# Patient Record
(demographics unavailable — no encounter records)

---

## 2024-10-17 NOTE — PLAN
[FreeTextEntry1] : In regards to patients Physical exam, routine blood work drawn, will review results with patient.   The patient has a moderate to high pre-test probability of Obstructive Sleep Apnea. This assessment is based on a high STOP-BANG score, medical history and clinical examination. As a result, diagnostic polysomnography is recommended to determine the presence and severity of this disorder.   Counseling included abnormal lab results, differential diagnoses, treatment options, risks and benefits, lifestyle changes, current condition, medications, and dose adjustments.  The patient was interactive, attentive, asked questions, and verbalized understanding

## 2024-10-17 NOTE — HISTORY OF PRESENT ILLNESS
[FreeTextEntry1] : physical exam  [de-identified] : Mr. KELSEY POOLE is a 36 year male comes to the office for physical exam. Patient denies fever, cough SOB. No other complaints at this time.

## 2024-10-17 NOTE — HEALTH RISK ASSESSMENT
[Good] : ~his/her~  mood as  good [Yes] : Yes [Monthly or less (1 pt)] : Monthly or less (1 point) [1 or 2 (0 pts)] : 1 or 2 (0 points) [Never (0 pts)] : Never (0 points) [No] : In the past 12 months have you used drugs other than those required for medical reasons? No [0] : 2) Feeling down, depressed, or hopeless: Not at all (0) [PHQ-2 Positive] : PHQ-2 Positive [Never] : Never [NO] : No [Patient declined Low Dose CT Scan] : Patient declined Low Dose CT Scan [Patient declined Retinal Exam] : Patient declined Retinal Exam. [HIV test declined] : HIV test declined [Hepatitis C test declined] : Hepatitis C test declined [Audit-CScore] : 1 [[id number]] : 0

## 2025-04-21 NOTE — HEALTH RISK ASSESSMENT
[Good] : ~his/her~  mood as  good [Yes] : Yes [Monthly or less (1 pt)] : Monthly or less (1 point) [1 or 2 (0 pts)] : 1 or 2 (0 points) [Never (0 pts)] : Never (0 points) [No] : In the past 12 months have you used drugs other than those required for medical reasons? No [0] : 2) Feeling down, depressed, or hopeless: Not at all (0) [PHQ-2 Positive] : PHQ-2 Positive [Audit-CScore] : 1 [[id number]] : 0 [Never] : Never [NO] : No [Patient declined Low Dose CT Scan] : Patient declined Low Dose CT Scan [Patient declined Retinal Exam] : Patient declined Retinal Exam. [HIV test declined] : HIV test declined [Hepatitis C test declined] : Hepatitis C test declined

## 2025-04-21 NOTE — HISTORY OF PRESENT ILLNESS
[FreeTextEntry1] : follow up  [de-identified] : Mr. KELSEY POOLE is a 36 year male comes to the office for physical exam. Patient denies fever, cough SOB. No other complaints at this time.

## 2025-04-21 NOTE — PLAN
[FreeTextEntry1] : HLD- lifestyle modifications recommended will retest lipids in 6 months.   Prior to appointment and during encounter with patient extensive medical records were reviewed including but not limited to, Hospital records, out patient records, laboratory data and microbiology data    Total encounter total time 30 mins >50% of time spent counseling/coordinating care  Counseling included abnormal lab results, differential diagnoses, treatment options, risks and benefits, lifestyle changes, current condition, medications, and dose adjustments.  The patient was interactive, attentive, asked questions, and verbalized understanding

## 2025-05-15 NOTE — IMAGING
[Left] : left fingers [Fracture] : Fracture [de-identified] :  The patient is a well appearing 37 year old male of their stated age. Neck is supple & nontender to palpation. Negative Spurling's test.  Effected Hand/Wrist: Left ROM: Wrist Flexion: 0-80 degrees Wrist Extension: 0-30 degrees Finger Flexion/Extension: Full without deformity Inspection: Erythema: None Ecchymosis: middle finger Abrasions: None Effusion: Middle finger Deformity: None  Palpation: Crepitus: None Radial Head: Nontender Radial Shaft: Nontender Distal Radius: Nontender Olecranon: Nontender Ulnar Shaft: Nontender Distal Ulna: Nontender Interosseous Ligament: Nontender Proximal Carpal Row: Nontender Distal Carpal Row: Nontender Anatomic Snuff Box: Nontender TFCC: Nontender Thumb UCL: Nontender Metacarpals: Nontender Proximal/Middle/Distal Phalanx 1,2,4,5: Nontender ; proximal phalanx of the middle finger tender Stress Testing: Thumb UCL 0: Stable Thumb UCL 30: Stable  Motor: Wrist Flexion: 5 out of 5 Wrist Extension: 5 out of 5 Interossei: 5 out of 5 : 4 out of 5 Finger Flexion: 5 out of 5, except for middle finger 3 out of 5, except for middle finger 3 out of 5 Finger Extension: 5 out of 5 Neurologic Exam: Axillary Nerve: SLT Radial Nerve: SLT Median Nerve: SLT Ulnar Nerve: SLT  Other: N/A Vascular Exam: Radial Pulse: 2+ Ulnar Pulse: 2+ Capillary Refill: <2 Seconds Nerve Compression Tests: Carpal Tunnel Compression Test: Negative Elbow Ulnar Nerve Tinels Test: Negative Other Exams: None  Pertinent Contralateral Hand/Wrist Findings: None  Assessment: The patient is a 37 year old male with left middle finger injury playing football being bent backwards and radiographic and physical exam findings consistent with closed avulsion fracture of the proximal phalanx of the middle finger. Medical Decision Making Enoch Frye presents with a suspected finger fracture after a football injury. Physical examination revealed inability to close the affected finger, swelling, and pain localized to the middle finger, particularly when pressure is applied to the side. X-ray findings indicate a possible avulsion fracture, with a small chip of bone visible and identical lines present on the affected finger that are absent on others. The injury is consistent with a ligament pulling a piece of bone off during the trauma. Given the non-displaced nature of the fracture and the patient's ability to straighten the finger, surgical intervention is not indicated at this time. The decision was made to immobilize the finger with a splint to prevent further displacement and allow for proper healing. Differential diagnoses considered included a severe sprain or ligament tear, which were ruled out based on the X-ray findings. The potential for displacement of the fracture was acknowledged, necessitating follow-up imaging to ensure proper healing.  The patients condition is acute Documents/Results Reviewed Today: Radiographs completed in clinic today Tests/Studies Independently Interpreted Today: Three-view plain film radiograph of the hand, and 2 view plain film radiograph of the middle finger Pertinent findings include: Fracture of the proximal phalanx third finger.  No other acute osseous abnormalities Confounding medical conditions/concerns: None  Plan: Closed avulsion fracture middle finger Assessment: Patient presents with a finger injury to the left hand sustained while playing football. Physical examination reveals swelling and inability to proximal the affected finger. X-ray findings indicate a small avulsion fracture, likely due to ligament pull. The injury is described as a "chip" or "tiny line" visible on imaging. There is no indication for surgical intervention at this time. Plan: Patient was given a follow-up appointment with orthopedic hand specialist for further evaluation and management.  Appointment will be provided by the urgent care  staff prior to discharge. Radiographs reviewed with the patient at the bedside.  Ample time provided to ask questions. Activity restrictions were discussed in clinic. Patient educated on diagnosis and provided reassurance.  Answered all questions. - Apply orthoglass splint to left hand and forearm - Prescribe naproxen for pain and inflammation.  Treat the patient's pain with naproxen 500 mg p.o. twice daily for 7 to 14 days as needed.  Counseled the risk of GI bleeding renal injury prolonged use NSAIDs.   - Take with food, twice daily (breakfast and dinner)   - Continue for 1 week, then as needed - Ice the affected area for the first couple of days. Encouraged Price.  10 to 15 minutes of ice every 1-2 hours for the next 72 hours.  Then every 2-3 hours thereafter as needed for pain relief.  Never place ice directly on the skin as it can cause damage. - Avoid getting the splint wet - Follow up with Dr. Kamara in 1 week for reassessment and repeat X-rays - Avoid physical activity involving the injured hand for at least 6 weeks Tests Ordered: No additional testing Prescription Medications Ordered: Naproxen Braces/DME Ordered: Radial gutter splint Activity/Work/Sports Status: Activity restrictions discussed in clinic Additional Instructions: None Follow-Up:  Follow-up with orthopedic specialist which you were given appointment for today by the orthopedic urgent care  staff as directed.Follow-up with primary care manager for continuity of care. Discussed and reviewed current medications  Patient to continue with present medications - all medications reconciled/reviewed during this visit and listed above.    Increase fluid intake.  At least  45 minutes was spent with patient face to face examining and reviewing findings/results during this visit. Ample time was provided to answer any questions or address concerns to the patients satisfaction.  The patient's current medication management of their orthopedic diagnosis was reviewed today: (1) We discussed a comprehensive treatment plan that included possible pharmaceutical management involving the use of prescription strength medications including but not limited to options such as oral Naprosyn 500mg BID, once daily Meloxicam 15 mg, or 500-650 mg Tylenol versus over the counter oral medications and topical prescription NSAID Pennsaid vs over the counter Voltaren gel. (2) There is a moderate risk of morbidity with further treatment, especially from use of prescription strength medications and possible side effects of these medications which include upset stomach with oral medications, skin reactions to topical medications and cardiac/renal issues with long term use. (3) I recommended that the patient follow-up with their medical physician to discuss any significant specific potential issues with long term medication use such as interactions with current medications or with exacerbation of underlying medical comorbidities. (4) The benefits and risks associated with use of injectable, oral or topical, prescription and over the counter anti-inflammatory medications were discussed with the patient. The patient voiced understanding of the risks including but not limited to bleeding, stroke, kidney dysfunction, heart disease, and were referred to the black box warning label for further information. [FreeTextEntry9] : Fracture of the proximal phalanx of the third finger.  Avulsion fracture

## 2025-05-15 NOTE — HISTORY OF PRESENT ILLNESS
[4] : 4 [2] : 2 [Dull/Aching] : dull/aching [Nothing helps with pain getting better] : Nothing helps with pain getting better [Full time] : Work status: full time [de-identified] : Chief Complaint "I think I broke a finger, maybe two" while playing football  History of Present Illness Enoch Frye [facility] with a suspected broken finger, possibly two, sustained while playing football. The patient reports that during an attempted interception, another player caught his fingers, causing them to bend abnormally.  The injury occurred to the patient's left hand, specifically affecting the middle finger and possibly another. Enoch describes pain primarily in the middle finger, particularly when pressure is applied to the side. He notes swelling in the hand and is unable to close the affected finger(s), though he can straighten them. The pain is localized from the knuckles down, affecting specific areas of the finger(s). Enoch does not report pain on the sides or top of the fingers when asked. The patient's ability to perform certain hand movements is limited due to the injury, suggesting a potential impact on daily functioning.  Medications and Supplements - Naproxen   - Non-steroidal anti-inflammatory   - Take with breakfast and dinner   - Take with a little bit of food   - Prescribed for 2 weeks  Social History - Occupation: Plays football - Physical Activity: Participates in football  Review of Systems Musculoskeletal: Positive for finger pain, inability to close affected fingers. Negative for pain on top of fingers or on sides. [] : no [FreeTextEntry1] : left pointer and middle finger  [FreeTextEntry3] : 5/11/25  [FreeTextEntry5] : RHD, pt reports while playing flag football he bent the fingers the wrong way  [FreeTextEntry6] : swelling in the hand  [de-identified] : using the hand, making a fist, bending the finger [de-identified] : AC

## 2025-05-15 NOTE — HISTORY OF PRESENT ILLNESS
[4] : 4 [2] : 2 [Dull/Aching] : dull/aching [Nothing helps with pain getting better] : Nothing helps with pain getting better [Full time] : Work status: full time [de-identified] : Chief Complaint "I think I broke a finger, maybe two" while playing football  History of Present Illness Enoch Frye [facility] with a suspected broken finger, possibly two, sustained while playing football. The patient reports that during an attempted interception, another player caught his fingers, causing them to bend abnormally.  The injury occurred to the patient's left hand, specifically affecting the middle finger and possibly another. Enoch describes pain primarily in the middle finger, particularly when pressure is applied to the side. He notes swelling in the hand and is unable to close the affected finger(s), though he can straighten them. The pain is localized from the knuckles down, affecting specific areas of the finger(s). Enoch does not report pain on the sides or top of the fingers when asked. The patient's ability to perform certain hand movements is limited due to the injury, suggesting a potential impact on daily functioning.  Medications and Supplements - Naproxen   - Non-steroidal anti-inflammatory   - Take with breakfast and dinner   - Take with a little bit of food   - Prescribed for 2 weeks  Social History - Occupation: Plays football - Physical Activity: Participates in football  Review of Systems Musculoskeletal: Positive for finger pain, inability to close affected fingers. Negative for pain on top of fingers or on sides. [] : no [FreeTextEntry1] : left pointer and middle finger  [FreeTextEntry3] : 5/11/25  [FreeTextEntry5] : RHD, pt reports while playing flag football he bent the fingers the wrong way  [FreeTextEntry6] : swelling in the hand  [de-identified] : using the hand, making a fist, bending the finger [de-identified] : AC

## 2025-05-15 NOTE — HISTORY OF PRESENT ILLNESS
[4] : 4 [2] : 2 [Dull/Aching] : dull/aching [Nothing helps with pain getting better] : Nothing helps with pain getting better [Full time] : Work status: full time [de-identified] : Chief Complaint "I think I broke a finger, maybe two" while playing football  History of Present Illness Enoch Frye [facility] with a suspected broken finger, possibly two, sustained while playing football. The patient reports that during an attempted interception, another player caught his fingers, causing them to bend abnormally.  The injury occurred to the patient's left hand, specifically affecting the middle finger and possibly another. Enoch describes pain primarily in the middle finger, particularly when pressure is applied to the side. He notes swelling in the hand and is unable to close the affected finger(s), though he can straighten them. The pain is localized from the knuckles down, affecting specific areas of the finger(s). Enoch does not report pain on the sides or top of the fingers when asked. The patient's ability to perform certain hand movements is limited due to the injury, suggesting a potential impact on daily functioning.  Medications and Supplements - Naproxen   - Non-steroidal anti-inflammatory   - Take with breakfast and dinner   - Take with a little bit of food   - Prescribed for 2 weeks  Social History - Occupation: Plays football - Physical Activity: Participates in football  Review of Systems Musculoskeletal: Positive for finger pain, inability to close affected fingers. Negative for pain on top of fingers or on sides. [] : no [FreeTextEntry1] : left pointer and middle finger  [FreeTextEntry3] : 5/11/25  [FreeTextEntry5] : RHD, pt reports while playing flag football he bent the fingers the wrong way  [FreeTextEntry6] : swelling in the hand  [de-identified] : using the hand, making a fist, bending the finger [de-identified] : AC

## 2025-05-15 NOTE — IMAGING
[Left] : left fingers [Fracture] : Fracture [de-identified] :  The patient is a well appearing 37 year old male of their stated age. Neck is supple & nontender to palpation. Negative Spurling's test.  Effected Hand/Wrist: Left ROM: Wrist Flexion: 0-80 degrees Wrist Extension: 0-30 degrees Finger Flexion/Extension: Full without deformity Inspection: Erythema: None Ecchymosis: middle finger Abrasions: None Effusion: Middle finger Deformity: None  Palpation: Crepitus: None Radial Head: Nontender Radial Shaft: Nontender Distal Radius: Nontender Olecranon: Nontender Ulnar Shaft: Nontender Distal Ulna: Nontender Interosseous Ligament: Nontender Proximal Carpal Row: Nontender Distal Carpal Row: Nontender Anatomic Snuff Box: Nontender TFCC: Nontender Thumb UCL: Nontender Metacarpals: Nontender Proximal/Middle/Distal Phalanx 1,2,4,5: Nontender ; proximal phalanx of the middle finger tender Stress Testing: Thumb UCL 0: Stable Thumb UCL 30: Stable  Motor: Wrist Flexion: 5 out of 5 Wrist Extension: 5 out of 5 Interossei: 5 out of 5 : 4 out of 5 Finger Flexion: 5 out of 5, except for middle finger 3 out of 5, except for middle finger 3 out of 5 Finger Extension: 5 out of 5 Neurologic Exam: Axillary Nerve: SLT Radial Nerve: SLT Median Nerve: SLT Ulnar Nerve: SLT  Other: N/A Vascular Exam: Radial Pulse: 2+ Ulnar Pulse: 2+ Capillary Refill: <2 Seconds Nerve Compression Tests: Carpal Tunnel Compression Test: Negative Elbow Ulnar Nerve Tinels Test: Negative Other Exams: None  Pertinent Contralateral Hand/Wrist Findings: None  Assessment: The patient is a 37 year old male with left middle finger injury playing football being bent backwards and radiographic and physical exam findings consistent with closed avulsion fracture of the proximal phalanx of the middle finger. Medical Decision Making Enoch Frye presents with a suspected finger fracture after a football injury. Physical examination revealed inability to close the affected finger, swelling, and pain localized to the middle finger, particularly when pressure is applied to the side. X-ray findings indicate a possible avulsion fracture, with a small chip of bone visible and identical lines present on the affected finger that are absent on others. The injury is consistent with a ligament pulling a piece of bone off during the trauma. Given the non-displaced nature of the fracture and the patient's ability to straighten the finger, surgical intervention is not indicated at this time. The decision was made to immobilize the finger with a splint to prevent further displacement and allow for proper healing. Differential diagnoses considered included a severe sprain or ligament tear, which were ruled out based on the X-ray findings. The potential for displacement of the fracture was acknowledged, necessitating follow-up imaging to ensure proper healing.  The patients condition is acute Documents/Results Reviewed Today: Radiographs completed in clinic today Tests/Studies Independently Interpreted Today: Three-view plain film radiograph of the hand, and 2 view plain film radiograph of the middle finger Pertinent findings include: Fracture of the proximal phalanx third finger.  No other acute osseous abnormalities Confounding medical conditions/concerns: None  Plan: Closed avulsion fracture middle finger Assessment: Patient presents with a finger injury to the left hand sustained while playing football. Physical examination reveals swelling and inability to proximal the affected finger. X-ray findings indicate a small avulsion fracture, likely due to ligament pull. The injury is described as a "chip" or "tiny line" visible on imaging. There is no indication for surgical intervention at this time. Plan: Patient was given a follow-up appointment with orthopedic hand specialist for further evaluation and management.  Appointment will be provided by the urgent care  staff prior to discharge. Radiographs reviewed with the patient at the bedside.  Ample time provided to ask questions. Activity restrictions were discussed in clinic. Patient educated on diagnosis and provided reassurance.  Answered all questions. - Apply orthoglass splint to left hand and forearm - Prescribe naproxen for pain and inflammation.  Treat the patient's pain with naproxen 500 mg p.o. twice daily for 7 to 14 days as needed.  Counseled the risk of GI bleeding renal injury prolonged use NSAIDs.   - Take with food, twice daily (breakfast and dinner)   - Continue for 1 week, then as needed - Ice the affected area for the first couple of days. Encouraged Price.  10 to 15 minutes of ice every 1-2 hours for the next 72 hours.  Then every 2-3 hours thereafter as needed for pain relief.  Never place ice directly on the skin as it can cause damage. - Avoid getting the splint wet - Follow up with Dr. Kamara in 1 week for reassessment and repeat X-rays - Avoid physical activity involving the injured hand for at least 6 weeks Tests Ordered: No additional testing Prescription Medications Ordered: Naproxen Braces/DME Ordered: Radial gutter splint Activity/Work/Sports Status: Activity restrictions discussed in clinic Additional Instructions: None Follow-Up:  Follow-up with orthopedic specialist which you were given appointment for today by the orthopedic urgent care  staff as directed.Follow-up with primary care manager for continuity of care. Discussed and reviewed current medications  Patient to continue with present medications - all medications reconciled/reviewed during this visit and listed above.    Increase fluid intake.  At least  45 minutes was spent with patient face to face examining and reviewing findings/results during this visit. Ample time was provided to answer any questions or address concerns to the patients satisfaction.  The patient's current medication management of their orthopedic diagnosis was reviewed today: (1) We discussed a comprehensive treatment plan that included possible pharmaceutical management involving the use of prescription strength medications including but not limited to options such as oral Naprosyn 500mg BID, once daily Meloxicam 15 mg, or 500-650 mg Tylenol versus over the counter oral medications and topical prescription NSAID Pennsaid vs over the counter Voltaren gel. (2) There is a moderate risk of morbidity with further treatment, especially from use of prescription strength medications and possible side effects of these medications which include upset stomach with oral medications, skin reactions to topical medications and cardiac/renal issues with long term use. (3) I recommended that the patient follow-up with their medical physician to discuss any significant specific potential issues with long term medication use such as interactions with current medications or with exacerbation of underlying medical comorbidities. (4) The benefits and risks associated with use of injectable, oral or topical, prescription and over the counter anti-inflammatory medications were discussed with the patient. The patient voiced understanding of the risks including but not limited to bleeding, stroke, kidney dysfunction, heart disease, and were referred to the black box warning label for further information. [FreeTextEntry9] : Fracture of the proximal phalanx of the third finger.  Avulsion fracture

## 2025-05-15 NOTE — IMAGING
[Left] : left fingers [Fracture] : Fracture [de-identified] :  The patient is a well appearing 37 year old male of their stated age. Neck is supple & nontender to palpation. Negative Spurling's test.  Effected Hand/Wrist: Left ROM: Wrist Flexion: 0-80 degrees Wrist Extension: 0-30 degrees Finger Flexion/Extension: Full without deformity Inspection: Erythema: None Ecchymosis: middle finger Abrasions: None Effusion: Middle finger Deformity: None  Palpation: Crepitus: None Radial Head: Nontender Radial Shaft: Nontender Distal Radius: Nontender Olecranon: Nontender Ulnar Shaft: Nontender Distal Ulna: Nontender Interosseous Ligament: Nontender Proximal Carpal Row: Nontender Distal Carpal Row: Nontender Anatomic Snuff Box: Nontender TFCC: Nontender Thumb UCL: Nontender Metacarpals: Nontender Proximal/Middle/Distal Phalanx 1,2,4,5: Nontender ; proximal phalanx of the middle finger tender Stress Testing: Thumb UCL 0: Stable Thumb UCL 30: Stable  Motor: Wrist Flexion: 5 out of 5 Wrist Extension: 5 out of 5 Interossei: 5 out of 5 : 4 out of 5 Finger Flexion: 5 out of 5, except for middle finger 3 out of 5, except for middle finger 3 out of 5 Finger Extension: 5 out of 5 Neurologic Exam: Axillary Nerve: SLT Radial Nerve: SLT Median Nerve: SLT Ulnar Nerve: SLT  Other: N/A Vascular Exam: Radial Pulse: 2+ Ulnar Pulse: 2+ Capillary Refill: <2 Seconds Nerve Compression Tests: Carpal Tunnel Compression Test: Negative Elbow Ulnar Nerve Tinels Test: Negative Other Exams: None  Pertinent Contralateral Hand/Wrist Findings: None  Assessment: The patient is a 37 year old male with left middle finger injury playing football being bent backwards and radiographic and physical exam findings consistent with closed avulsion fracture of the proximal phalanx of the middle finger. Medical Decision Making Enoch Frye presents with a suspected finger fracture after a football injury. Physical examination revealed inability to close the affected finger, swelling, and pain localized to the middle finger, particularly when pressure is applied to the side. X-ray findings indicate a possible avulsion fracture, with a small chip of bone visible and identical lines present on the affected finger that are absent on others. The injury is consistent with a ligament pulling a piece of bone off during the trauma. Given the non-displaced nature of the fracture and the patient's ability to straighten the finger, surgical intervention is not indicated at this time. The decision was made to immobilize the finger with a splint to prevent further displacement and allow for proper healing. Differential diagnoses considered included a severe sprain or ligament tear, which were ruled out based on the X-ray findings. The potential for displacement of the fracture was acknowledged, necessitating follow-up imaging to ensure proper healing.  The patients condition is acute Documents/Results Reviewed Today: Radiographs completed in clinic today Tests/Studies Independently Interpreted Today: Three-view plain film radiograph of the hand, and 2 view plain film radiograph of the middle finger Pertinent findings include: Fracture of the proximal phalanx third finger.  No other acute osseous abnormalities Confounding medical conditions/concerns: None  Plan: Closed avulsion fracture middle finger Assessment: Patient presents with a finger injury to the left hand sustained while playing football. Physical examination reveals swelling and inability to proximal the affected finger. X-ray findings indicate a small avulsion fracture, likely due to ligament pull. The injury is described as a "chip" or "tiny line" visible on imaging. There is no indication for surgical intervention at this time. Plan: Patient was given a follow-up appointment with orthopedic hand specialist for further evaluation and management.  Appointment will be provided by the urgent care  staff prior to discharge. Radiographs reviewed with the patient at the bedside.  Ample time provided to ask questions. Activity restrictions were discussed in clinic. Patient educated on diagnosis and provided reassurance.  Answered all questions. - Apply orthoglass splint to left hand and forearm - Prescribe naproxen for pain and inflammation.  Treat the patient's pain with naproxen 500 mg p.o. twice daily for 7 to 14 days as needed.  Counseled the risk of GI bleeding renal injury prolonged use NSAIDs.   - Take with food, twice daily (breakfast and dinner)   - Continue for 1 week, then as needed - Ice the affected area for the first couple of days. Encouraged Price.  10 to 15 minutes of ice every 1-2 hours for the next 72 hours.  Then every 2-3 hours thereafter as needed for pain relief.  Never place ice directly on the skin as it can cause damage. - Avoid getting the splint wet - Follow up with Dr. Kamara in 1 week for reassessment and repeat X-rays - Avoid physical activity involving the injured hand for at least 6 weeks Tests Ordered: No additional testing Prescription Medications Ordered: Naproxen Braces/DME Ordered: Radial gutter splint Activity/Work/Sports Status: Activity restrictions discussed in clinic Additional Instructions: None Follow-Up:  Follow-up with orthopedic specialist which you were given appointment for today by the orthopedic urgent care  staff as directed.Follow-up with primary care manager for continuity of care. Discussed and reviewed current medications  Patient to continue with present medications - all medications reconciled/reviewed during this visit and listed above.    Increase fluid intake.  At least  45 minutes was spent with patient face to face examining and reviewing findings/results during this visit. Ample time was provided to answer any questions or address concerns to the patients satisfaction.  The patient's current medication management of their orthopedic diagnosis was reviewed today: (1) We discussed a comprehensive treatment plan that included possible pharmaceutical management involving the use of prescription strength medications including but not limited to options such as oral Naprosyn 500mg BID, once daily Meloxicam 15 mg, or 500-650 mg Tylenol versus over the counter oral medications and topical prescription NSAID Pennsaid vs over the counter Voltaren gel. (2) There is a moderate risk of morbidity with further treatment, especially from use of prescription strength medications and possible side effects of these medications which include upset stomach with oral medications, skin reactions to topical medications and cardiac/renal issues with long term use. (3) I recommended that the patient follow-up with their medical physician to discuss any significant specific potential issues with long term medication use such as interactions with current medications or with exacerbation of underlying medical comorbidities. (4) The benefits and risks associated with use of injectable, oral or topical, prescription and over the counter anti-inflammatory medications were discussed with the patient. The patient voiced understanding of the risks including but not limited to bleeding, stroke, kidney dysfunction, heart disease, and were referred to the black box warning label for further information. [FreeTextEntry9] : Fracture of the proximal phalanx of the third finger.  Avulsion fracture

## 2025-05-19 NOTE — HISTORY OF PRESENT ILLNESS
[8] : 8 [2] : 2 [Dull/Aching] : dull/aching [Localized] : localized [Constant] : constant [Full time] : Work status: full time [de-identified] : 37 year old male presents LT index fx, last seen a week ago at [facility]. Pt's injury was 8 days ago, playing football. He is in splint  DOI: 5/11/25 [] : no [FreeTextEntry1] : left pointer and middle finger  [FreeTextEntry3] : 5/11/25  [FreeTextEntry5] : patient was playing flag football and his two fingers bend sideways and felt a pop. Went to [facility] the next day.  D [FreeTextEntry6] : swelling in the hand  [FreeTextEntry9] : splint, naproxen [de-identified] : using the hand, making a fist, bending the finger [de-identified] : x-ray @ [facility]  [de-identified] : splint, naproxen  [de-identified] : AC

## 2025-05-19 NOTE — DISCUSSION/SUMMARY
[de-identified] : Discussed the nature of the diagnosis and risk and benefits of different modalities of treatment. LT index proximal phalanx avulsion fx  X rays reviewed & discussed He will continue to splint  RTO 1 week

## 2025-06-05 NOTE — DISCUSSION/SUMMARY
[de-identified] : Discussed the nature of the diagnosis and risk and benefits of different modalities of treatment. LT index proximal phalanx avulsion fx  X rays reviewed & discussed He will continue to splint  RTO 2 week

## 2025-06-05 NOTE — HISTORY OF PRESENT ILLNESS
[8] : 8 [2] : 2 [Dull/Aching] : dull/aching [Localized] : localized [Constant] : constant [Full time] : Work status: full time [de-identified] : 37 year old male followed for LT index proximal phalanx avulsion fx. He is 19 days s/p injury.   DOI: 5/11/25 [] : no [FreeTextEntry1] : left pointer and middle finger  [FreeTextEntry3] : 5/11/25  [FreeTextEntry5] : patient was playing flag football and his two fingers bend sideways and felt a pop. Went to [facility] the next day.  D [FreeTextEntry6] : swelling in the hand  [FreeTextEntry9] : splint, naproxen [de-identified] : using the hand, making a fist, bending the finger [de-identified] : x-ray @ [facility]  [de-identified] : splint, naproxen  [de-identified] : AC

## 2025-06-05 NOTE — DISCUSSION/SUMMARY
[de-identified] : Discussed the nature of the diagnosis and risk and benefits of different modalities of treatment. LT index proximal phalanx avulsion fx  X rays reviewed & discussed He will continue to splint  RTO 2 week

## 2025-06-05 NOTE — HISTORY OF PRESENT ILLNESS
[8] : 8 [2] : 2 [Dull/Aching] : dull/aching [Localized] : localized [Constant] : constant [Full time] : Work status: full time [de-identified] : 37 year old male followed for LT index proximal phalanx avulsion fx. He is 19 days s/p injury.   DOI: 5/11/25 [] : no [FreeTextEntry1] : left pointer and middle finger  [FreeTextEntry3] : 5/11/25  [FreeTextEntry5] : patient was playing flag football and his two fingers bend sideways and felt a pop. Went to [facility] the next day.  D [FreeTextEntry6] : swelling in the hand  [FreeTextEntry9] : splint, naproxen [de-identified] : using the hand, making a fist, bending the finger [de-identified] : x-ray @ [facility]  [de-identified] : splint, naproxen  [de-identified] : AC

## 2025-06-13 NOTE — PHYSICAL EXAM
[Left] : left fingers [The fracture is in acceptable alignment. There is progression in healing seen] : The fracture is in acceptable alignment. There is progression in healing seen [3rd] : 3rd [PIP Joint] : PIP joint

## 2025-06-13 NOTE — DISCUSSION/SUMMARY
[de-identified] : Discussed the nature of the diagnosis and risk and benefits of different modalities of treatment. LT index proximal phalanx avulsion fx  LT middle PIP sprain  X rays reviewed & discussed He will aubrey gross He will do OT  Rx provided  RTO 2 weeks

## 2025-06-13 NOTE — REASON FOR VISIT
[FreeTextEntry2] :  left pointer and middle finger  reports pain is variable, some days worse than others

## 2025-06-27 NOTE — HISTORY OF PRESENT ILLNESS
[8] : 8 [2] : 2 [Dull/Aching] : dull/aching [Localized] : localized [Constant] : constant [Full time] : Work status: full time [de-identified] : 37 year old male followed for LT index proximal phalanx avulsion fx & LT middle PIP sprain. He has been doing OT twice a week. He has been aubrey strapping  DOI: 5/11/25 [] : no [FreeTextEntry1] : left pointer and middle finger  [FreeTextEntry3] : 5/11/25  [FreeTextEntry6] : swelling in the hand  [FreeTextEntry9] : splint, naproxen [de-identified] : using the hand, making a fist, bending the finger [de-identified] : x-ray @ [facility]  [de-identified] : splint, naproxen  [de-identified] : AC

## 2025-06-27 NOTE — DISCUSSION/SUMMARY
[de-identified] : Discussed the nature of the diagnosis and risk and benefits of different modalities of treatment. LT index proximal phalanx avulsion fx  LT middle PIP sprain  He will continue OT Rx provided RTO 4 weeks

## 2025-07-25 NOTE — PHYSICAL EXAM
[Left] : left hand [3rd] : 3rd [PIP Joint] : PIP joint [FreeTextEntry9] : all fingers are 0.5 to DPC, UCL RCL stable, PIP flexion 105

## 2025-07-25 NOTE — DISCUSSION/SUMMARY
[de-identified] : Discussed the nature of the diagnosis and risk and benefits of different modalities of treatment. LT index proximal phalanx avulsion fx  LT middle PIP sprain  Patient will apply warm compresses & take OTC NSAIDs as needed Discussed edema exercises  AROM RTO 6 weeks

## 2025-07-25 NOTE — HISTORY OF PRESENT ILLNESS
[de-identified] : 37 year old male followed for LT index proximal phalanx avulsion fx & LT middle PIP sprain. He has been doing OT twice a week. Swelling present.  DOI: 5/11/25